# Patient Record
Sex: MALE | Race: WHITE
[De-identification: names, ages, dates, MRNs, and addresses within clinical notes are randomized per-mention and may not be internally consistent; named-entity substitution may affect disease eponyms.]

---

## 2018-05-20 NOTE — EDM.PDOC
ED HPI GENERAL MEDICAL PROBLEM





- General


Chief Complaint: Upper Extremity Injury/Pain


Stated Complaint: dropped [employer] of vehicle on hand [phone]


Time Seen by Provider: 05/20/18 20:49


Source of Information: Reports: Patient


History Limitations: Reports: No Limitations





- History of Present Illness


INITIAL COMMENTS - FREE TEXT/NARRATIVE: 





[place] of jeep fell onto left wrist yesterday today thumb all swollen and painful


  ** Right Hand


Pain Score (Numeric/FACES): 6





- Related Data


 Allergies











Allergy/AdvReac Type Severity Reaction Status Date / Time


 


cyclobenzaprine HCl Allergy  Tachycardia Verified 05/20/18 20:19





[From Flexeril]     


 


meperidine HCl [From Demerol] Allergy  Vomiting Verified 05/20/18 20:19


 


morphine Allergy  Vomiting Verified 05/20/18 20:19


 


Sulfa (Sulfonamide Allergy  Itching Verified 05/20/18 20:19





Antibiotics)     


 


bee stings Allergy  Tachycardia Uncoded 05/20/18 20:19











Home Meds: 


 Home Meds





Digoxin [Digox] 0.5 tab PO QPM 10/04/14 [History]


Digoxin [Digox] 1 tab PO QAM 10/04/14 [History]


FLUoxetine HCl [Fluoxetine HCl] 1 tab PO DAILY 10/04/14 [History]


Lisinopril [Prinivil] 2 tab PO DAILY 10/04/14 [History]


Metoprolol Tartrate [Lopressor] 1 tab PO BID 10/04/14 [History]


Gabapentin [Neurontin] 600 mg PO TID 04/03/16 [History]


Meloxicam 15 mg PO DAILY 04/03/16 [History]


Methocarbamol 500 mg PO ASDIRECTED PRN 04/03/16 [History]


Multivitamin with Minerals [Multiple Vitamin] 1 tab PO DAILY 04/03/16 [History]


Omeprazole 20 mg PO DAILY 04/03/16 [History]


traZODone HCl [Trazodone HCl] 50 mg PO DAILY 05/20/18 [History]











Past Medical History


HEENT History: Reports: Impaired Vision


Other HEENT History: wears glasses


Cardiovascular History: Reports: CAD, Heart Valve Replacement, High Cholesterol

, Hypertension


Respiratory History: Reports: None


Gastrointestinal History: Reports: GERD


Genitourinary History: Reports: None


Musculoskeletal History: Reports: Back Pain, Chronic, Osteoarthritis


Neurological History: Reports: None


Psychiatric History: Reports: Depression


Endocrine/Metabolic History: Reports: None


Hematologic History: Reports: None


Immunologic History: Reports: None


Oncologic (Cancer) History: Reports: None


Dermatologic History: Reports: None





- Infectious Disease History


Infectious Disease History: Reports: Chicken Pox





- Past Surgical History


Head Surgeries/Procedures: Reports: None


GI Surgical History: Reports: Hernia, Inguinal





Social & Family History





- Family History


Family Medical History: Noncontributory





- Tobacco Use


Smoking Status *Q: Never Smoker


Second Hand Smoke Exposure: No





- Caffeine Use


Caffeine Use: Reports: Coffee, Soda, Tea





- Recreational Drug Use


Recreational Drug Use: No





- Living Situation & Occupation


Living situation: Reports: , with Family


Occupation: Employed





Review of Systems





- Review of Systems


Review Of Systems: ROS reveals no pertinent complaints other than HPI.





ED EXAM, GENERAL





- Physical Exam


Exam: See Below


Exam Limited By: No Limitations


General Appearance: Alert, WD/WN, Mild Distress, Other (pain)


Ears: Hearing Grossly Normal


Throat/Mouth: Normal Voice, No Airway Compromise


Head: Atraumatic


Neck: Non-Tender, Full Range of Motion


Respiratory/Chest: No Respiratory Distress


Cardiovascular: Regular Rate, Rhythm


GI/Abdominal: Soft, Non-Tender


Extremities: Other (left thumb swollen tender R/P, NV wnl. no gross D/D.)


Neurological: Alert, Oriented, Normal Cognition, Normal Gait, No Motor/Sensory 

Deficits


Psychiatric: Flat Affect


Skin Exam: Warm, Dry, Normal Color


Lymphatic: No Adenopathy





Course





- Vital Signs


Last Recorded V/S: 


 Last Vital Signs











Temp  36.6 C   05/20/18 20:15


 


Pulse  58 L  05/20/18 20:15


 


Resp  18   05/20/18 20:15


 


BP  117/56 L  05/20/18 20:15


 


Pulse Ox  97   05/20/18 20:15














- Re-Assessments/Exams


Free Text/Narrative Re-Assessment/Exam: 





05/20/18 21:21


results discussed with pt.





Departure





- Departure


Time of Disposition: 21:22


Disposition: Home, Self-Care 01


Condition: Good


Clinical Impression: 


Thumb contusion


Qualifiers:


 Encounter type: initial encounter Damage to nail status: without damage 

Laterality: left Qualified Code(s): S60.012A - Contusion of left thumb without 

damage to nail, initial encounter








- Discharge Information


Instructions:  Contusion, Easy-to-Read


Referrals: 


Aileen Garcia, NP [Primary Care Provider] - 


Forms:  ED Department Discharge


Additional Instructions: 


1) wear brace for comfort 


2) ice intermittently to swelling 


3) see clinic for possible MRI SCAN if not totally better in few days


4) recheck if there is any change or concern

## 2018-07-09 NOTE — EDM.PDOC
Scribed by Maria Elena Reddy 07/09/18 [id number] for Valente Valerio MD





ED HPI GENERAL MEDICAL PROBLEM





- General


Chief Complaint: Back Pain or Injury


Stated Complaint: [id number] severe BACK PAIN


Time Seen by Provider: 07/09/18 17:16


Source of Information: Reports: Patient, RN, RN Notes Reviewed


History Limitations: Reports: No Limitations





- History of Present Illness


INITIAL COMMENTS - FREE TEXT/NARRATIVE: 


Patient presents to ER with complaint of flareup of chronic low back pain 

without any recent or new injury. Pain has been radiating down the right leg to 

the knee. Patient has history of 5 surgeries including hardware placement and 

fusion of the lower lumbar segments by Dr. Mcqueen in [place]. He has an 

appointment to see his doctor on Wednesday. Patient takes hydrocodone, 

Gabapentin and methacarbamol and Meloxican for his chronic pain. 


Onset: Gradual


Duration: Getting Worse


Location: Reports: Back (low)


Quality: Reports: Ache


Severity: Severe


Improves with: Reports: None


Worsens with: Reports: None


Associated Symptoms: Reports: No Other Symptoms





- Related Data


 Allergies











Allergy/AdvReac Type Severity Reaction Status Date / Time


 


cyclobenzaprine HCl Allergy  Tachycardia Verified 05/20/18 20:19





[From Flexeril]     


 


meperidine HCl [From Demerol] Allergy  Vomiting Verified 05/20/18 20:19


 


morphine Allergy  Vomiting Verified 05/20/18 20:19


 


Sulfa (Sulfonamide Allergy  Itching Verified 05/20/18 20:19





Antibiotics)     


 


bee stings Allergy  Tachycardia Uncoded 05/20/18 20:19











Home Meds: 


 Home Meds





Digoxin [Digox] 0.5 tab PO QPM 10/04/14 [History]


Digoxin [Digox] 1 tab PO QAM 10/04/14 [History]


Lisinopril [Prinivil] 2 tab PO DAILY 10/04/14 [History]


Metoprolol Tartrate [Lopressor] 1 tab PO BID 10/04/14 [History]


Gabapentin [Neurontin] 600 mg PO TID 04/03/16 [History]


Meloxicam 15 mg PO DAILY 04/03/16 [History]


Methocarbamol 500 mg PO ASDIRECTED PRN 04/03/16 [History]


Multivitamin with Minerals [Multiple Vitamin] 1 tab PO DAILY 04/03/16 [History]


Omeprazole 20 mg PO DAILY 04/03/16 [History]


traZODone HCl [Trazodone HCl] 50 mg PO DAILY 05/20/18 [History]











Past Medical History


HEENT History: Reports: Impaired Vision


Other HEENT History: wears glasses


Cardiovascular History: Reports: CAD, Heart Valve Replacement, High Cholesterol

, Hypertension


Respiratory History: Reports: None


Gastrointestinal History: Reports: GERD


Genitourinary History: Reports: None


Musculoskeletal History: Reports: Back Pain, Chronic, Osteoarthritis


Neurological History: Reports: None


Psychiatric History: Reports: Depression


Endocrine/Metabolic History: Reports: None


Hematologic History: Reports: None


Immunologic History: Reports: None


Oncologic (Cancer) History: Reports: None


Dermatologic History: Reports: None





- Infectious Disease History


Infectious Disease History: Reports: Chicken Pox





- Past Surgical History


Head Surgeries/Procedures: Reports: None


GI Surgical History: Reports: Hernia, Inguinal





Social & Family History





- Family History


Family Medical History: Noncontributory





- Caffeine Use


Caffeine Use: Reports: Coffee, Soda, Tea





- Living Situation & Occupation


Living situation: Reports: , with Family


Occupation: Employed





ED ROS GENERAL





- Review of Systems


Review Of Systems: ROS reveals no pertinent complaints other than HPI.





ED EXAM,LOWER BACK PAIN/INJURY





- Physical Exam


Exam: See Below


Exam Limited By: No Limitations


General Appearance: Alert, WD/WN, No Apparent Distress


Head: Atraumatic, Normocephalic


Respiratory/Chest: No Respiratory Distress


Cardiovascular: Normal Peripheral Pulses


GI/Abdominal: Normal Bowel Sounds, Soft, Non-Tender, No Distention


Back Exam: Decreased Range of Motion (due to pain), Muscle Spasm (lumbar region)

, Paraspinal Tenderness (lumbar ), Other (A well healed midline low lumbar 

surgical scar of approximately 7cm. ).  No: CVA Tenderness (L), CVA Tenderness (

R), Vertebral Tenderness


Extremities: Normal Inspection, Normal Range of Motion, Non-Tender, No Pedal 

Edema, Normal Capillary Refill


Neurological: Alert, Normal Mood/Affect, Normal Dorsiflexion, CN II-XII Intact, 

Normal Plantar Flexion, No Motor/Sensory Deficits, Oriented x 3, Other (

antalgic gait, but steady.)


Psychiatric: Normal Affect, Normal Mood


Skin Exam: Warm, Dry, Intact, Normal Color, No Rash





Course





- Orders/Labs/Meds


Meds: 


Medications














Discontinued Medications














Generic Name Dose Route Start Last Admin





  Trade Name Freq  PRN Reason Stop Dose Admin


 


Dexamethasone  8 mg  07/09/18 17:36  07/09/18 17:44





  Dexamethasone  IM  07/09/18 17:37  8 mg





  ONETIME ONE   Administration





     





     





     





     














Departure





- Departure


Time of Disposition: 17:44


Disposition: Home, Self-Care 01


Condition: Fair


Clinical Impression: 


 Acute exacerbation of chronic low back pain, Lumbar radiculopathy








- Discharge Information


Instructions:  Chronic Back Pain, Lumbosacral Radiculopathy


Referrals: 


Aileen Garcia NP [Primary Care Provider] - 


Forms:  ED Department Discharge


Additional Instructions: 


RX:  Decadron 4mg.


RX:  Lidocaine ointment5%.


Follow up with your back specialist as scheduled.





I have read and agree with the documentation that has been completed regarding 

this visit. By signing this record, I attest that the documentation was 

completed in my physical presence and is an accurate record of the encounter.

## 2019-11-02 ENCOUNTER — HOSPITAL ENCOUNTER (EMERGENCY)
Dept: HOSPITAL 43 - DL.ED | Age: 49
Discharge: HOME | End: 2019-11-02
Payer: COMMERCIAL

## 2019-11-02 VITALS — SYSTOLIC BLOOD PRESSURE: 146 MMHG | HEART RATE: 65 BPM | DIASTOLIC BLOOD PRESSURE: 71 MMHG

## 2019-11-02 DIAGNOSIS — Z88.2: ICD-10-CM

## 2019-11-02 DIAGNOSIS — Z91.030: ICD-10-CM

## 2019-11-02 DIAGNOSIS — M06.9: Primary | ICD-10-CM

## 2019-11-02 DIAGNOSIS — I10: ICD-10-CM

## 2019-11-02 DIAGNOSIS — I25.10: ICD-10-CM

## 2019-11-02 DIAGNOSIS — Z88.5: ICD-10-CM

## 2019-11-02 DIAGNOSIS — Z79.899: ICD-10-CM

## 2019-11-02 DIAGNOSIS — K21.9: ICD-10-CM

## 2019-11-02 PROCEDURE — 96372 THER/PROPH/DIAG INJ SC/IM: CPT

## 2019-11-02 PROCEDURE — 99283 EMERGENCY DEPT VISIT LOW MDM: CPT

## 2019-11-02 NOTE — EDM.PDOC
Scribed by Maria Elena Reddy 11/02/19 1228 for Valente Valeroi MD





ED HPI GENERAL MEDICAL PROBLEM





- General


Chief Complaint: General


Stated Complaint: PAIN


Time Seen by Provider: 11/02/19 12:10


Source of Information: Reports: Patient, RN, RN Notes Reviewed


History Limitations: Reports: No Limitations





- History of Present Illness


INITIAL COMMENTS - FREE TEXT/NARRATIVE: 


Patient presents to ER by POV with complaint of generalized pain from chronic 

health issues. He has history of RA and OA, back surgeries and neck surgeries. 

Denies any new or recent injury. 


Onset: Gradual


Duration: Constant


Location: Reports: Generalized


Quality: Reports: Ache


Severity: Severe


Improves with: Reports: None


Worsens with: Reports: None


Associated Symptoms: Reports: No Other Symptoms


  ** Generalized


Pain Score (Numeric/FACES): 4





- Related Data


 Allergies











Allergy/AdvReac Type Severity Reaction Status Date / Time


 


meperidine HCl [From Demerol] Allergy  Vomiting Verified 11/02/19 12:02


 


morphine Allergy  Vomiting Verified 11/02/19 12:02


 


Sulfa (Sulfonamide Allergy  Itching Verified 11/02/19 12:02





Antibiotics)     


 


bee stings Allergy  Tachycardia Uncoded 11/02/19 12:02











Home Meds: 


 Home Meds





Digoxin [Digox] 0.5 tab PO QPM 10/04/14 [History]


Lisinopril [Prinivil] 2 tab PO DAILY 10/04/14 [History]


Metoprolol Tartrate [Lopressor] 150 tab PO BID 10/04/14 [History]


Gabapentin [Neurontin] 1,200 mg PO TID 04/03/16 [History]


Meloxicam 15 mg PO DAILY 04/03/16 [History]


Methocarbamol 500 mg PO ASDIRECTED PRN 04/03/16 [History]


Multivitamin with Minerals [Multiple Vitamin] 1 tab PO DAILY 04/03/16 [History]


Omeprazole 20 mg PO DAILY 04/03/16 [History]


traZODone HCl [Trazodone HCl] 50 mg PO DAILY 05/20/18 [History]


Hydrocodone/Acetaminophen [Hydrocodon-Acetaminophen 5-325] 1 tab PO ASDIRECTED 

PRN 03/07/19 [History]


Digoxin [Digitek] 250 mcg PO DAILY 11/02/19 [History]











Past Medical History


HEENT History: Reports: Impaired Vision


Other HEENT History: wears glasses


Cardiovascular History: Reports: CAD, Heart Valve Replacement, High Cholesterol

, Hypertension, Other (See Below)


Respiratory History: Reports: None


Gastrointestinal History: Reports: GERD


Genitourinary History: Reports: None


Musculoskeletal History: Reports: Back Pain, Chronic, Neck Pain, Chronic, 

Osteoarthritis


Neurological History: Reports: None


Psychiatric History: Reports: Depression


Endocrine/Metabolic History: Reports: None


Hematologic History: Reports: None


Immunologic History: Reports: None


Oncologic (Cancer) History: Reports: None


Dermatologic History: Reports: None





- Infectious Disease History


Infectious Disease History: Reports: Chicken Pox





- Past Surgical History


Head Surgeries/Procedures: Reports: None


GI Surgical History: Reports: Hernia, Inguinal


Musculoskeletal Surgical History: Reports: Carpal Tunnel, Other (See Below)


Other Musculoskeletal Surgeries/Procedures:: back, neck surgery





Social & Family History





- Family History


Family Medical History: Noncontributory





- Tobacco Use


Smoking Status *Q: Never Smoker





- Caffeine Use


Caffeine Use: Reports: Energy Drinks





- Recreational Drug Use


Recreational Drug Use: No





- Living Situation & Occupation


Living situation: Reports: , with Family


Occupation: Employed





ED ROS GENERAL





- Review of Systems


Review Of Systems: ROS reveals no pertinent complaints other than HPI.





ED EXAM, GENERAL





- Physical Exam


Exam: See Below


Exam Limited By: No Limitations


General Appearance: Alert, WD/WN, No Apparent Distress


Eye Exam: Bilateral Eye: Normal Inspection


Throat/Mouth: Normal Inspection


Head: Atraumatic, Normocephalic


Neck: Normal Inspection, Non-Tender


Respiratory/Chest: No Respiratory Distress, Lungs Clear, Normal Breath Sounds, 

No Accessory Muscle Use, Chest Non-Tender


Cardiovascular: Regular Rate, Rhythm


Back Exam: Decreased Range of Motion (Chronic/stable per pt).  No: CVA 

Tenderness (L), CVA Tenderness (R), Paraspinal Tenderness, Vertebral Tenderness


Extremities: Normal Capillary Refill, Joint Swelling (Chronic/stable at B/L 

hands/fingers per pt.), Limited Range of Motion (Hands, fingers, ankles).  No: 

Increased Warmth, Redness


Neurological: Alert, Oriented, CN II-XII Intact, Normal Cognition, No Motor/

Sensory Deficits


Psychiatric: Normal Mood


Skin Exam: Warm, Dry, Intact, Normal Color, No Rash





Course





- Vital Signs


Last Recorded V/S: 


 Last Vital Signs











Temp  97.6 F   11/02/19 11:56


 


Pulse  65   11/02/19 11:56


 


Resp  14   11/02/19 11:56


 


BP  146/71 H  11/02/19 11:56


 


Pulse Ox  100   11/02/19 11:56














- Orders/Labs/Meds


Orders: 


 Active Orders 24 hr











 Category Date Time Status


 


 Lidocaine 5% Med  11/02/19 12:29 Once





 15 gm TOP ONETIME ONE   











Meds: 


Medications














Discontinued Medications














Generic Name Dose Route Start Last Admin





  Trade Name Freq  PRN Reason Stop Dose Admin


 


Ketorolac Tromethamine  60 mg  11/02/19 12:23  





  Toradol  IM  11/02/19 12:24  





  ONETIME ONE   





     





     





     





     














Departure





- Departure


Time of Disposition: 12:27


Disposition: Home, Self-Care 01


Condition: Good


Clinical Impression: 


 Rheumatoid arthritis flare








- Discharge Information


*PRESCRIPTION DRUG MONITORING PROGRAM REVIEWED*: No


*COPY OF PRESCRIPTION DRUG MONITORING REPORT IN PATIENT OLAYINKA: No


Instructions:  Arthritis


Forms:  ED Department Discharge


Additional Instructions: 


Rx: Prednisone 20mg


Use the Lidocaine Ointment to affected joints every 4 to 6 hours as needed for 

pain.


Follow up in clinic this week for recheck and referral to rheumatologist. 





- My Orders


Last 24 Hours: 


My Active Orders





11/02/19 12:29


Lidocaine 5%   15 gm TOP ONETIME ONE 














- Assessment/Plan


Last 24 Hours: 


My Active Orders





11/02/19 12:29


Lidocaine 5%   15 gm TOP ONETIME ONE 














I have read and agree with the documentation that has been completed regarding 

this visit. By signing this record, I attest that the documentation was 

completed in my physical presence and is an accurate record of the encounter.

## 2021-01-09 ENCOUNTER — HOSPITAL ENCOUNTER (EMERGENCY)
Dept: HOSPITAL 43 - DL.ED | Age: 51
Discharge: HOME | End: 2021-01-09
Payer: COMMERCIAL

## 2021-01-09 VITALS — DIASTOLIC BLOOD PRESSURE: 47 MMHG | SYSTOLIC BLOOD PRESSURE: 106 MMHG | HEART RATE: 64 BPM

## 2021-01-09 DIAGNOSIS — I10: ICD-10-CM

## 2021-01-09 DIAGNOSIS — R79.89: ICD-10-CM

## 2021-01-09 DIAGNOSIS — Z91.030: ICD-10-CM

## 2021-01-09 DIAGNOSIS — R07.89: ICD-10-CM

## 2021-01-09 DIAGNOSIS — Z88.2: ICD-10-CM

## 2021-01-09 DIAGNOSIS — Z88.5: ICD-10-CM

## 2021-01-09 DIAGNOSIS — I25.10: ICD-10-CM

## 2021-01-09 DIAGNOSIS — R55: ICD-10-CM

## 2021-01-09 DIAGNOSIS — K21.9: ICD-10-CM

## 2021-01-09 DIAGNOSIS — Z79.899: ICD-10-CM

## 2021-01-09 DIAGNOSIS — G45.8: Primary | ICD-10-CM

## 2021-01-09 LAB
ANION GAP SERPL CALC-SCNC: 15.1 MEQ/L (ref 7–13)
APTT PPP: 28.1 SEC (ref 22–34)
CHLORIDE SERPL-SCNC: 105 MMOL/L (ref 98–107)
SODIUM SERPL-SCNC: 141 MMOL/L (ref 136–145)

## 2021-01-09 PROCEDURE — 84484 ASSAY OF TROPONIN QUANT: CPT

## 2021-01-09 PROCEDURE — 85379 FIBRIN DEGRADATION QUANT: CPT

## 2021-01-09 PROCEDURE — 85730 THROMBOPLASTIN TIME PARTIAL: CPT

## 2021-01-09 PROCEDURE — 71045 X-RAY EXAM CHEST 1 VIEW: CPT

## 2021-01-09 PROCEDURE — 83690 ASSAY OF LIPASE: CPT

## 2021-01-09 PROCEDURE — 36415 COLL VENOUS BLD VENIPUNCTURE: CPT

## 2021-01-09 PROCEDURE — 85025 COMPLETE CBC W/AUTO DIFF WBC: CPT

## 2021-01-09 PROCEDURE — 82550 ASSAY OF CK (CPK): CPT

## 2021-01-09 PROCEDURE — 85610 PROTHROMBIN TIME: CPT

## 2021-01-09 PROCEDURE — 84443 ASSAY THYROID STIM HORMONE: CPT

## 2021-01-09 PROCEDURE — 80053 COMPREHEN METABOLIC PANEL: CPT

## 2021-01-09 PROCEDURE — 80162 ASSAY OF DIGOXIN TOTAL: CPT

## 2021-01-09 PROCEDURE — 83880 ASSAY OF NATRIURETIC PEPTIDE: CPT

## 2021-01-09 PROCEDURE — 82150 ASSAY OF AMYLASE: CPT

## 2021-01-09 PROCEDURE — 99284 EMERGENCY DEPT VISIT MOD MDM: CPT

## 2021-01-09 PROCEDURE — 83735 ASSAY OF MAGNESIUM: CPT

## 2021-01-09 PROCEDURE — 93010 ELECTROCARDIOGRAM REPORT: CPT

## 2021-01-09 PROCEDURE — 93005 ELECTROCARDIOGRAM TRACING: CPT

## 2021-01-09 NOTE — CR
PROCEDURE INFORMATION: 

Exam: XR Chest, 1 View 

Exam date and time: 1/9/2021 10:44 AM 

Age: 50 years old 

Clinical indication: Chest pain 



TECHNIQUE: 

Imaging protocol: XR of the chest 

Views: 1 view. 



COMPARISON: 

CR Chest 1V Frontal 8/26/2018 4:39 PM 



FINDINGS: 

Lungs: Unremarkable. No consolidation. 

Pleural space: Unremarkable. No pleural effusion. No pneumothorax. 

Heart/Mediastinum: The heart size is mildly enlarged with postoperative change 

from prior cardiac surgery. 

Bones/joints: Unremarkable. 



IMPRESSION: 

1. Cardiomegaly and postoperative change. No acute cardiopulmonary disease 

present.

## 2021-01-09 NOTE — EDM.PDOC
Scribed by Maria Elena Reddy 21 [id number] for Cuauhtemoc Valerio MD





ED HPI GENERAL MEDICAL PROBLEM





- General


Chief Complaint: Chest Pain


Stated Complaint: CHEST PAIN


Time Seen by Provider: 21 10:22


Source of Information: Reports: Patient, RN, RN Notes Reviewed


History Limitations: Reports: No Limitations





- History of Present Illness


INITIAL COMMENTS - FREE TEXT/NARRATIVE: 


Patient arrives to ED by POV with chest pain for 2 days. He has had recurrent 

postural near syncope for 2 weeks. Chest pain is intermittent centered in left 

chest radiating into left arm accompanied with nausea. No reported vomiting. 

Also complaining of dry cough. Rates the pain 3/10. Reports history of cold 

during  where he felt congested with nausea and chills. Negative COVID 

test around . History of a tricuspid valve reconstruction in . 

History of neck fusion and lumbar rods as well as spinal stimulator Denies 

fever, productive cough, chills, headache, blurry vision, difficulty swallowing,

shortness of breath, abdominal pain and edema in the lower extremities. 


Onset: Gradual


Duration: Waxing/Waning


Location: Reports: Chest, Upper Extremity, Left


Quality: Reports: Sharp


Severity: Mild


Improves with: Reports: None


Worsens with: Reports: None


Associated Symptoms: Reports: Nausea/Vomiting, Syncope (near)


  ** Left Chest


Pain Score (Numeric/FACES): 3





- Related Data


                                    Allergies











Allergy/AdvReac Type Severity Reaction Status Date / Time


 


meperidine HCl [From Demerol] Allergy  Vomiting Verified 21 10:24


 


morphine Allergy  Vomiting Verified 21 10:24


 


Sulfa (Sulfonamide Allergy  Itching Verified 21 10:24





Antibiotics)     


 


bee stings Allergy  Tachycardia Uncoded 21 10:24











Home Meds: 


                                    Home Meds





Metoprolol Tartrate [Lopressor] 150 tab PO BID 10/04/14 [History]


lisinopriL [Prinivil] 2 tab PO DAILY 10/04/14 [History]


Gabapentin [Neurontin] 1,200 mg PO TID 16 [History]


Meloxicam 15 mg PO DAILY 16 [History]


Multivitamin with Minerals [Multiple Vitamin] 1 tab PO DAILY 16 [History]


Omeprazole 20 mg PO DAILY 16 [History]


traZODone HCl [Trazodone HCl] 50 mg PO DAILY 18 [History]


Digoxin [Digitek] 250 mcg PO DAILY 19 [History]











Past Medical History


HEENT History: Reports: Impaired Vision


Other HEENT History: wears glasses


Cardiovascular History: Reports: CAD, Heart Valve Replacement, High Cholesterol,

 Hypertension, Other (See Below)


Respiratory History: Reports: None


Gastrointestinal History: Reports: GERD


Genitourinary History: Reports: None


Musculoskeletal History: Reports: Back Pain, Chronic, Neck Pain, Chronic, 

Osteoarthritis


Neurological History: Reports: None


Psychiatric History: Reports: Depression


Endocrine/Metabolic History: Reports: None


Hematologic History: Reports: None


Immunologic History: Reports: None


Oncologic (Cancer) History: Reports: None


Dermatologic History: Reports: None





- Infectious Disease History


Infectious Disease History: Reports: Chicken Pox





- Past Surgical History


Head Surgeries/Procedures: Reports: None


GI Surgical History: Reports: Hernia, Inguinal


Musculoskeletal Surgical History: Reports: Carpal Tunnel, Other (See Below)


Other Musculoskeletal Surgeries/Procedures:: back, neck surgery





Social & Family History





- Family History


Family Medical History: No Pertinent Family History





- Caffeine Use


Caffeine Use: Reports: Energy Drinks





- Living Situation & Occupation


Living situation: Reports: , with Family


Occupation: Employed





ED ROS GENERAL





- Review of Systems


Review Of Systems: Comprehensive ROS is negative, except as noted in HPI.





ED EXAM, GENERAL





- Physical Exam


Exam: See Below


Exam Limited By: No Limitations


General Appearance: Alert, WD/WN, No Apparent Distress


Nose: Normal Inspection


Throat/Mouth: Normal Inspection, Normal Lips, Normal Teeth, Normal Gums, Normal 

Oropharynx, Normal Voice, No Airway Compromise


Head: Atraumatic, Normocephalic


Neck: Normal Inspection, Supple, Non-Tender, Full Range of Motion


Respiratory/Chest: No Respiratory Distress, Lungs Clear, Normal Breath Sounds, 

No Accessory Muscle Use, Chest Non-Tender


Cardiovascular: Normal Peripheral Pulses, Regular Rate, Rhythm, No Edema, No 

Gallop, No JVD, No Murmur, No Rub


Peripheral Pulses: 2+: Radial (R), 4+: Carotid (L), Carotid (R), Brachial (L), 

Brachial (R), Radial (L), Femoral (L), Femoral (R), Posterior Tibial (L), 

Posterior Tibial (R), Dorsalis Pedis (L), Dorsalis Pedis (R)


GI/Abdominal: Soft, Non-Tender


 (Male) Exam: Deferred


Rectal (Males) Exam: Deferred


Neurological: Alert, Oriented, Normal Cognition


Psychiatric: Normal Affect


  ** #1 Interpretation


EKG Date: 21


Time: 10:25


Rhythm: Other (sinus rhythm)


Rate (Beats/Min): 61


Axis: Normal


P-Wave: Present


QRS: Normal


ST-T: Other (borderline T abnormalities, diffuse leads)


QT: Normal


Comparison: No Change





Course





- Vital Signs


Last Recorded V/S: 


                                Last Vital Signs











Temp  97.3 F   21 10:20


 


Pulse  64   21 10:20


 


Resp  18   21 10:20


 


BP  106/47 L  21 10:20


 


Pulse Ox  95   21 10:20








                                        





Orthostatic Blood Pressure [     104/56


Standing]                        


Orthostatic Blood Pressure [     105/59


Sitting]                         


Orthostatic Blood Pressure [     103/51


Supine]                          











- Orders/Labs/Meds


Orders: 


                               Active Orders 24 hr











 Category Date Time Status


 


 EKG 12 Lead [EKG Documentation Completion] [RC] STAT Care  21 10:23 

Active


 


 Orthostatic Vital Signs [RC] ASDIRECTED Care  21 10:33 Active


 


 Peripheral IV Care [RC] .AS DIRECTED Care  21 10:24 Active


 


 Sodium Chloride 0.9% [Saline Flush] Med  21 10:24 Active





 10 ml FLUSH ASDIRECTED PRN   


 


 Peripheral IV Insertion Adult [OM.PC] Routine Oth  21 10:23 Ordered








                                Medication Orders





Sodium Chloride (Saline Flush)  10 ml FLUSH ASDIRECTED PRN


   PRN Reason: Keep Vein Open


   Last Admin: 21 10:41  Dose: 10 ml


   Documented by: SUNSHINE








Labs: 


                                Laboratory Tests











  21 Range/Units





  10:36 10:36 10:36 


 


WBC  8.0    (5.0-10.0)  10^3/uL


 


RBC  4.44 L    (4.6-6.2)  10^6/uL


 


Hgb  13.8 L    (14.0-18.0)  g/dL


 


Hct  39.9 L    (40.0-54.0)  %


 


MCV  89.9    ()  fL


 


MCH  31.1    (27.0-34.0)  pg


 


MCHC  34.6    (33.0-35.0)  g/dL


 


Plt Count  171    (150-450)  10^3/uL


 


Neut % (Auto)  63.2    (42.2-75.2)  %


 


Lymph % (Auto)  24.5    (20.5-50.1)  %


 


Mono % (Auto)  10.3 H    (2-8)  %


 


Eos % (Auto)  1.6    (1.0-3.0)  %


 


Baso % (Auto)  0.4    (0.0-1.0)  %


 


PT    10.4  (9.0-12.0)  SEC


 


INR    1.1  (0.9-1.2)  


 


APTT    28.1  (22.0-34.0)  SEC


 


D-Dimer, Quantitative     (0-400)  ng/mL


 


Sodium   141   (136-145)  mmol/L


 


Potassium   4.1   (3.5-5.1)  mmol/L


 


Chloride   105   ()  mmol/L


 


Carbon Dioxide   25   (21-32)  mmol/L


 


Anion Gap   15.1 H   (7-13)  mEq/L


 


BUN   23 H   (7-18)  mg/dL


 


Creatinine   1.08   (0.70-1.30)  mg/dL


 


Est Cr Clr Drug Dosing   74.66   mL/min


 


Estimated GFR (MDRD)   > 60   


 


BUN/Creatinine Ratio   21.3   (No establ ref range)  


 


Glucose   95   (74-99)  mg/dL


 


Calcium   8.5   (8.5-10.1)  mg/dL


 


Magnesium   1.9   (1.8-2.4)  mg/dL


 


Total Bilirubin   0.3   (0.2-1.0)  mg/dL


 


AST   28   (15-37)  U/L


 


ALT   39   (16-63)  U/L


 


Alkaline Phosphatase   123 H   ()  U/L


 


Creatine Kinase   231   ()  U/L


 


Troponin I   < 0.017   (0.000-0.056)  ng/mL


 


B-Natriuretic Peptide   37   (0-100)  pg/ml


 


Total Protein   7.4   (6.4-8.2)  g/dL


 


Albumin   3.8   (3.4-5.0)  g/dL


 


Globulin   3.6   


 


Albumin/Globulin Ratio   1.1   


 


Amylase   51   ()  U/L


 


Lipase   193   ()  U/L


 


TSH, Ultra Sensitive   1.18   (0.36-3.74)  uIU/mL


 


Digoxin     (0.9-2.0)  ng/mL














  21 Range/Units





  10:36 10:36 


 


WBC    (5.0-10.0)  10^3/uL


 


RBC    (4.6-6.2)  10^6/uL


 


Hgb    (14.0-18.0)  g/dL


 


Hct    (40.0-54.0)  %


 


MCV    ()  fL


 


MCH    (27.0-34.0)  pg


 


MCHC    (33.0-35.0)  g/dL


 


Plt Count    (150-450)  10^3/uL


 


Neut % (Auto)    (42.2-75.2)  %


 


Lymph % (Auto)    (20.5-50.1)  %


 


Mono % (Auto)    (2-8)  %


 


Eos % (Auto)    (1.0-3.0)  %


 


Baso % (Auto)    (0.0-1.0)  %


 


PT    (9.0-12.0)  SEC


 


INR    (0.9-1.2)  


 


APTT    (22.0-34.0)  SEC


 


D-Dimer, Quantitative   < 100  (0-400)  ng/mL


 


Sodium    (136-145)  mmol/L


 


Potassium    (3.5-5.1)  mmol/L


 


Chloride    ()  mmol/L


 


Carbon Dioxide    (21-32)  mmol/L


 


Anion Gap    (7-13)  mEq/L


 


BUN    (7-18)  mg/dL


 


Creatinine    (0.70-1.30)  mg/dL


 


Est Cr Clr Drug Dosing    mL/min


 


Estimated GFR (MDRD)    


 


BUN/Creatinine Ratio    (No establ ref range)  


 


Glucose    (74-99)  mg/dL


 


Calcium    (8.5-10.1)  mg/dL


 


Magnesium    (1.8-2.4)  mg/dL


 


Total Bilirubin    (0.2-1.0)  mg/dL


 


AST    (15-37)  U/L


 


ALT    (16-63)  U/L


 


Alkaline Phosphatase    ()  U/L


 


Creatine Kinase    ()  U/L


 


Troponin I    (0.000-0.056)  ng/mL


 


B-Natriuretic Peptide    (0-100)  pg/ml


 


Total Protein    (6.4-8.2)  g/dL


 


Albumin    (3.4-5.0)  g/dL


 


Globulin    


 


Albumin/Globulin Ratio    


 


Amylase    ()  U/L


 


Lipase    ()  U/L


 


TSH, Ultra Sensitive    (0.36-3.74)  uIU/mL


 


Digoxin  2.3 H*   (0.9-2.0)  ng/mL











Meds: 


Medications











Generic Name Dose Route Start Last Admin





  Trade Name Freq  PRN Reason Stop Dose Admin


 


Sodium Chloride  10 ml  21 10:24  21 10:41





  Saline Flush  FLUSH   10 ml





  ASDIRECTED PRN   Administration





  Keep Vein Open  














Discontinued Medications














Generic Name Dose Route Start Last Admin





  Trade Name Freq  PRN Reason Stop Dose Admin


 


Aspirin  324 mg  21 10:25  21 10:40





  Aspirin  PO  21 10:26  324 mg





  ONETIME ONE   Administration














- Radiology Interpretation


Free Text/Narrative:: 


[facility] [place] [facility]


Final Radiology Report  Call: [phone]


assistance Online chat: [email or web address]


Name: SARAH BLACKBURN Age: 50Years M Date: 2021


MRN: [id number] SSN: -- : 1970


Study: CR CHEST 1V FRONTAL Requesting Physician: CUAUHTEMOC VALERIO


Accession: [id number] Images: 1


Addl Studies:


Provided Clinical History: chest pain


Contrast: Contrast Medium:


Contrast Amount: Contrast Method:


CONFIDENTIALITY STATEMENT


This report is intended only for use by the referring physician, and only in 

accordance with law. If you received this in error, call [phone].


Page 1 of 1


PROCEDURE INFORMATION:


Exam: XR Chest, 1 View


Exam date and time: 2021 10:44 AM


Age: 50 years old


Clinical indication: Chest pain


TECHNIQUE:


Imaging protocol: XR of the chest


Views: 1 view.


COMPARISON:


CR Chest 1V Frontal 2018 4:39 PM


FINDINGS:


Lungs: Unremarkable. No consolidation.


Pleural space: Unremarkable. No pleural effusion. No pneumothorax.


Heart/Mediastinum: The heart size is mildly enlarged with postoperative change 

from prior cardiac


surgery.


Bones/joints: Unremarkable.


IMPRESSION:


1. Cardiomegaly and postoperative change. No acute cardiopulmonary disease 

present.


Thank you for allowing us to participate in the care of your patient.


Dictated and Authenticated by: Joe Baires MD


2021 10:52 AM Central Time ([place] & [place])








- Re-Assessments/Exams


Free Text/Narrative Re-Assessment/Exam: 





21 11:51


Pt has essentially ruled himself out from an acute cardiac ischemia standpoint. 

He has negative Troponin, no ischemic changes on his EKG, and no other acute 

findings on exam. He does have a moderately noticeable stronger pulse at the l

eft radial, compared to the right radial wrist. Unclear if this is related to 

his recurrent near syncope or not. However, pt does relate that many years ago 

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

 and heavy feeling.  





Departure





- Departure


Time of Disposition: 11:57


Disposition: Home, Self-Care 01


Condition: Good


Clinical Impression: 


 Postural dizziness with near syncope, Subclavian steal syndrome of right 

subclavian artery, Atypical chest pain, Elevated digoxin level





Instructions:  Nonspecific Chest Pain, Adult, Near-Syncope


Forms:  ED Department Discharge


Additional Instructions: 


Do not take your Digoxin tomorrow or Monday.


Follow up Monday,  with your primary clinic for consideration of CT 

Angio Neck and chest for evaluation of the difference in blood pressure and 

pulse strength in on your right arm compared to the left. Also, to see if that 

explains a vascular cause for your postural near syncope episodes.





Sepsis Event Note (ED)





- Focused Exam


Vital Signs: 


                                   Vital Signs











  Temp Pulse Resp BP Pulse Ox


 


 21 10:20  97.3 F  64  18  106/47 L  95














- My Orders


Last 24 Hours: 


My Active Orders





21 10:23


EKG 12 Lead [EKG Documentation Completion] [RC] STAT 


Peripheral IV Insertion Adult [OM.PC] Routine 





21 10:24


Peripheral IV Care [RC] .AS DIRECTED 


Sodium Chloride 0.9% [Saline Flush]   10 ml FLUSH ASDIRECTED PRN 





21 10:33


Orthostatic Vital Signs [RC] ASDIRECTED 














- Assessment/Plan


Last 24 Hours: 


My Active Orders





21 10:23


EKG 12 Lead [EKG Documentation Completion] [RC] STAT 


Peripheral IV Insertion Adult [OM.PC] Routine 





21 10:24


Peripheral IV Care [RC] .AS DIRECTED 


Sodium Chloride 0.9% [Saline Flush]   10 ml FLUSH ASDIRECTED PRN 





21 10:33


Orthostatic Vital Signs [RC] ASDIRECTED 














I have read and agree with the documentation that has been completed regarding 

this visit. By signing this record, I attest that the documentation was 

completed in my physical presence and is an accurate record of the encounter.

## 2021-01-19 ENCOUNTER — HOSPITAL ENCOUNTER (EMERGENCY)
Dept: HOSPITAL 43 - DL.ED | Age: 51
Discharge: HOME | End: 2021-01-19
Payer: COMMERCIAL

## 2021-01-19 VITALS — HEART RATE: 56 BPM | SYSTOLIC BLOOD PRESSURE: 124 MMHG | DIASTOLIC BLOOD PRESSURE: 66 MMHG

## 2021-01-19 DIAGNOSIS — I10: ICD-10-CM

## 2021-01-19 DIAGNOSIS — Z88.5: ICD-10-CM

## 2021-01-19 DIAGNOSIS — R07.89: Primary | ICD-10-CM

## 2021-01-19 DIAGNOSIS — I25.10: ICD-10-CM

## 2021-01-19 DIAGNOSIS — K21.9: ICD-10-CM

## 2021-01-19 DIAGNOSIS — Z91.030: ICD-10-CM

## 2021-01-19 DIAGNOSIS — Z88.2: ICD-10-CM

## 2021-01-19 LAB
ANION GAP SERPL CALC-SCNC: 12.4 MEQ/L (ref 7–13)
CHLORIDE SERPL-SCNC: 103 MMOL/L (ref 98–107)
SODIUM SERPL-SCNC: 138 MMOL/L (ref 136–145)

## 2021-01-19 NOTE — CR
PROCEDURE INFORMATION: 

Exam: XR Chest, 1 View 

Exam date and time: 1/19/2021 11:05 AM 

Age: 50 years old 

Clinical indication: Other: Chest pain 



TECHNIQUE: 

Imaging protocol: XR of the chest 

Views: 1 view. 



COMPARISON: 

CR Chest 1V Frontal 1/9/2021 10:44 AM 



FINDINGS: 

Lungs: Unremarkable. No consolidation. 

Pleural space: Unremarkable. No pleural effusion. No pneumothorax. 

Heart/Mediastinum: Borderline heart size 

Bones/joints: Status post lower cervical fusion. There is evidence of a 

previous sternotomy. 



IMPRESSION: 

No acute findings.

## 2021-01-19 NOTE — EDM.PDOC
ED HPI GENERAL MEDICAL PROBLEM





- General


Chief Complaint: Cardiovascular Problem


Stated Complaint: CHEST DISCOMFORT


Time Seen by Provider: 01/19/21 11:15


Source of Information: Reports: Patient, Old Records, RN, RN Notes Reviewed


History Limitations: Reports: No Limitations





- History of Present Illness


INITIAL COMMENTS - FREE TEXT/NARRATIVE: 


Patient presents to the ED via personal vehicle with complaints of chest 

discomfort and shortness of breath.  The patient reports a history of tricuspid 

valve repair in 2004 and is subsequently on Digoxin, Metoprolol, and Lisinopril.

 He states he was seen in this facility last week for similar symptoms and has 

since been in contact with Cardiology; he has an appointment to be seen by 

Cardiology tomorrow.  The patient states his chest discomfort has been going on 

for several weeks and is not improving or worsening.  He denies recent illness, 

fever, shaking chills, cough, sore throat, palpitations, dyspepsia, nausea, 

vomiting, or diarrhea.  He denies tobacco, alcohol, or recreational drug use. 








- Related Data


                                    Allergies











Allergy/AdvReac Type Severity Reaction Status Date / Time


 


meperidine HCl [From Demerol] Allergy  Vomiting Verified 01/09/21 10:24


 


morphine Allergy  Vomiting Verified 01/09/21 10:24


 


Sulfa (Sulfonamide Allergy  Itching Verified 01/09/21 10:24





Antibiotics)     


 


bee stings Allergy  Tachycardia Uncoded 01/09/21 10:24











Home Meds: 


                                    Home Meds





Metoprolol Tartrate [Lopressor] 150 tab PO BID 10/04/14 [History]


lisinopriL [Prinivil] 2 tab PO DAILY 10/04/14 [History]


Gabapentin [Neurontin] 1,200 mg PO TID 04/03/16 [History]


Meloxicam 15 mg PO DAILY 04/03/16 [History]


Multivitamin with Minerals [Multiple Vitamin] 1 tab PO DAILY 04/03/16 [History]


Omeprazole 20 mg PO DAILY 04/03/16 [History]


traZODone HCl [Trazodone HCl] 50 mg PO DAILY 05/20/18 [History]


Digoxin [Digitek] 250 mcg PO DAILY 11/02/19 [History]











Past Medical History


HEENT History: Reports: Impaired Vision


Other HEENT History: wears glasses


Cardiovascular History: Reports: CAD, Heart Valve Replacement, High Cholesterol,

 Hypertension, Other (See Below)


Respiratory History: Reports: None


Gastrointestinal History: Reports: GERD


Genitourinary History: Reports: None


Musculoskeletal History: Reports: Back Pain, Chronic, Neck Pain, Chronic, 

Osteoarthritis


Neurological History: Reports: None


Psychiatric History: Reports: Depression


Endocrine/Metabolic History: Reports: None


Hematologic History: Reports: None


Immunologic History: Reports: None


Oncologic (Cancer) History: Reports: None


Dermatologic History: Reports: None





- Infectious Disease History


Infectious Disease History: Reports: Chicken Pox, Influenza





- Past Surgical History


Head Surgeries/Procedures: Reports: None


Cardiovascular Surgical History: Reports: Valve Replacement


GI Surgical History: Reports: Hernia, Inguinal


Musculoskeletal Surgical History: Reports: Carpal Tunnel, Other (See Below)


Other Musculoskeletal Surgeries/Procedures:: back, neck surgery, spinal cord 

stimulator





Social & Family History





- Family History


Family Medical History: No Pertinent Family History





- Tobacco Use


Tobacco Use Status *Q: Never Tobacco User





- Caffeine Use


Caffeine Use: Reports: None





- Recreational Drug Use


Recreational Drug Use: No





- Living Situation & Occupation


Living situation: Reports: , with Family


Occupation: Employed





ED ROS GENERAL





- Review of Systems


Review Of Systems: Comprehensive ROS is negative, except as noted in HPI.





ED EXAM, GENERAL





- Physical Exam


Exam: See Below


Exam Limited By: No Limitations


General Appearance: Alert, No Apparent Distress


Eye Exam: Bilateral Eye: EOMI, Normal Inspection, PERRL (3mm)


Throat/Mouth: Normal Inspection, Normal Voice, No Airway Compromise


Head: Atraumatic, Normocephalic


Neck: Normal Inspection, Supple, Non-Tender, Full Range of Motion.  No: 

Lymphadenopathy (L), Lymphadenopathy (R)


Respiratory/Chest: No Respiratory Distress, Lungs Clear, Normal Breath Sounds, 

No Accessory Muscle Use, Chest Non-Tender


Cardiovascular: Normal Peripheral Pulses, Regular Rate, Rhythm, No Edema, No 

Gallop, No JVD, No Murmur, No Rub


Peripheral Pulses: 1+: Radial (R), 2+: Radial (L)


GI/Abdominal: Normal Bowel Sounds, Soft, Non-Tender, No Distention, No Mass, 

Pelvis Stable


 (Male) Exam: Deferred


Rectal (Males) Exam: Deferred


Back Exam: Normal Inspection, Full Range of Motion


Extremities: Normal Inspection, Normal Range of Motion, Non-Tender, No Pedal 

Edema, Normal Capillary Refill


Neurological: Alert, Oriented, CN II-XII Intact, Normal Cognition, Normal Gait, 

No Motor/Sensory Deficits


Psychiatric: Normal Affect, Normal Mood


Skin Exam: Warm, Dry, Intact, Normal Color, No Rash.  No: Ecchymosis, Erythema, 

Increased Warmth, Jaundice, Mottled, Pallor, Petechiae, Rash


  ** #1 Interpretation


EKG Date: 01/19/21


Time: 10:59


Rhythm: Other (Sinus Bradycardia)


Axis: Normal


P-Wave: Present


QRS: Normal


ST-T: Normal


QT: Normal


Comparison: No Change


EKG Interpretation Comments: 


SB: No evidence of acute ischemia








Course





- Vital Signs


Last Recorded V/S: 


                                Last Vital Signs











Temp  98.2 F   01/19/21 11:02


 


Pulse  56 L  01/19/21 11:02


 


Resp  16   01/19/21 11:02


 


BP  124/66   01/19/21 11:02


 


Pulse Ox  99   01/19/21 11:02














- Orders/Labs/Meds


Orders: 


                               Active Orders 24 hr











 Category Date Time Status


 


 EKG Documentation Completion [RC] STAT Care  01/19/21 10:47 Active











Labs: 


                                Laboratory Tests











  01/19/21 01/19/21 01/19/21 Range/Units





  10:59 10:59 10:59 


 


WBC   8.3   (5.0-10.0)  10^3/uL


 


RBC   4.34 L   (4.6-6.2)  10^6/uL


 


Hgb   13.5 L   (14.0-18.0)  g/dL


 


Hct   38.9 L   (40.0-54.0)  %


 


MCV   89.6   ()  fL


 


MCH   31.1   (27.0-34.0)  pg


 


MCHC   34.7   (33.0-35.0)  g/dL


 


Plt Count   186   (150-450)  10^3/uL


 


Neut % (Auto)   64.2   (42.2-75.2)  %


 


Lymph % (Auto)   24.0   (20.5-50.1)  %


 


Mono % (Auto)   10.4 H   (2-8)  %


 


Eos % (Auto)   1.2   (1.0-3.0)  %


 


Baso % (Auto)   0.2   (0.0-1.0)  %


 


Sodium  138    (136-145)  mmol/L


 


Potassium  4.4    (3.5-5.1)  mmol/L


 


Chloride  103    ()  mmol/L


 


Carbon Dioxide  27    (21-32)  mmol/L


 


Anion Gap  12.4    (7-13)  mEq/L


 


BUN  25 H    (7-18)  mg/dL


 


Creatinine  1.01    (0.70-1.30)  mg/dL


 


Est Cr Clr Drug Dosing  85.59    mL/min


 


Estimated GFR (MDRD)  > 60    


 


BUN/Creatinine Ratio  24.8    (No establ ref range)  


 


Glucose  88    (74-99)  mg/dL


 


Calcium  8.8    (8.5-10.1)  mg/dL


 


Total Bilirubin  0.4    (0.2-1.0)  mg/dL


 


AST  20    (15-37)  U/L


 


ALT  38    (16-63)  U/L


 


Alkaline Phosphatase  125 H    ()  U/L


 


Troponin I  < 0.017    (0.000-0.056)  ng/mL


 


B-Natriuretic Peptide    44  (0-100)  pg/ml


 


Total Protein  7.6    (6.4-8.2)  g/dL


 


Albumin  3.8    (3.4-5.0)  g/dL


 


Globulin  3.8    


 


Albumin/Globulin Ratio  1.0    


 


Digoxin     (0.9-2.0)  ng/mL














  01/19/21 Range/Units





  10:59 


 


WBC   (5.0-10.0)  10^3/uL


 


RBC   (4.6-6.2)  10^6/uL


 


Hgb   (14.0-18.0)  g/dL


 


Hct   (40.0-54.0)  %


 


MCV   ()  fL


 


MCH   (27.0-34.0)  pg


 


MCHC   (33.0-35.0)  g/dL


 


Plt Count   (150-450)  10^3/uL


 


Neut % (Auto)   (42.2-75.2)  %


 


Lymph % (Auto)   (20.5-50.1)  %


 


Mono % (Auto)   (2-8)  %


 


Eos % (Auto)   (1.0-3.0)  %


 


Baso % (Auto)   (0.0-1.0)  %


 


Sodium   (136-145)  mmol/L


 


Potassium   (3.5-5.1)  mmol/L


 


Chloride   ()  mmol/L


 


Carbon Dioxide   (21-32)  mmol/L


 


Anion Gap   (7-13)  mEq/L


 


BUN   (7-18)  mg/dL


 


Creatinine   (0.70-1.30)  mg/dL


 


Est Cr Clr Drug Dosing   mL/min


 


Estimated GFR (MDRD)   


 


BUN/Creatinine Ratio   (No establ ref range)  


 


Glucose   (74-99)  mg/dL


 


Calcium   (8.5-10.1)  mg/dL


 


Total Bilirubin   (0.2-1.0)  mg/dL


 


AST   (15-37)  U/L


 


ALT   (16-63)  U/L


 


Alkaline Phosphatase   ()  U/L


 


Troponin I   (0.000-0.056)  ng/mL


 


B-Natriuretic Peptide   (0-100)  pg/ml


 


Total Protein   (6.4-8.2)  g/dL


 


Albumin   (3.4-5.0)  g/dL


 


Globulin   


 


Albumin/Globulin Ratio   


 


Digoxin  1.6  (0.9-2.0)  ng/mL














- Radiology Interpretation


Free Text/Narrative:: 


Cardiac workup unremarkable for acute processes.  CXR unremarkable for acute 

processes.  Lab results, imaging, and EKG discussed with patient.  Patient 

instructed to continue with Cardiology appointment, as previously scheduled.  

Patient verbalized understanding and agreement with the plan of care. 








Departure





- Departure


Time of Disposition: 11:59


Disposition: Home, Self-Care 01


Condition: Good


Clinical Impression: 


 Atypical chest pain





Instructions:  Nonspecific Chest Pain, Adult, Easy-to-Read


Forms:  ED Department Discharge


Additional Instructions: 


1.) Continue with your previously scheduled Cardiology appointment tomorrow for 

ongoing investigation into this problem. 


2.) Continue with your medications, as previously prescribed.


3.) Drink plenty of water to stay hydrated. 





Sepsis Event Note (ED)





- Evaluation


Sepsis Screening Result: No Definite Risk





- Focused Exam


Vital Signs: 


                                   Vital Signs











  Temp Pulse Resp BP Pulse Ox


 


 01/19/21 11:02  98.2 F  56 L  16  124/66  99

## 2021-05-19 NOTE — US
EXAMINATION: Retroperitoneal Comp  SEX: Male   AGE: 50 years

 

CLINICAL HISTORY: 50-year-old "dehydrated" male with acute kidney injury.

 

Interpretation: Negative exam.

1. Normal reniform size, axis and configuration bilaterally.

Right kidney measures 11.7 cm L. (Right kidney measures 6.07 cm W). 

Left kidney measures 12.68 cm L. (left kidney measures 6.53 cm W).

2. No sign of cystic or solid renal cortical mass lesion. No cortical "mantle"

inflammation or scar.

3. No sonographic evidence of urolithiasis or obstructive uropathy.

4. Symmetrically distended normal appearing urinary bladder. No mucosal wall

mass or dependent stones.

5. No extra vesicular pelvic mass lesion.
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
## 2021-05-19 NOTE — EDM.PDOC
ED HPI GENERAL MEDICAL PROBLEM





- General


Chief Complaint: General


Stated Complaint: DEHYDRATION ? SEEING WHITE FLASHES BODY ACHE TIRED


Time Seen by Provider: 05/19/21 11:20


Source of Information: Reports: Patient


History Limitations: Reports: No Limitations





- History of Present Illness


INITIAL COMMENTS - FREE TEXT/NARRATIVE: 





This 49 yo male patient reports to the ED due to feeling fatigue. The patient 

reports he was working outside on Monday when he started to feel some heart 

palpitations and saw spots. The patient reports yesterday he had several similar

episodes. The patient reports today he has been feeling very fatigued. The 

patient reports he called his primary care facility and was advised to come to 

the ED. The patient reports he has not had any heart palpitations or chest pain 

today. The patient reports he did attempt to replace his fluids, but reports 

drinking about 10 ounces of Gatorade today. 


Onset Date: 05/17/21


Duration: Intermittent


Location: Reports: Generalized


Quality: Reports: Other


Severity: Mild


Improves with: Reports: None


Worsens with: Reports: None


Context: Reports: Activity


Associated Symptoms: Reports: No Other Symptoms, Nausea/Vomiting (nausea no 

vomiting)





- Related Data


                                    Allergies











Allergy/AdvReac Type Severity Reaction Status Date / Time


 


meperidine HCl [From Demerol] Allergy  Vomiting Verified 01/09/21 10:24


 


morphine Allergy  Vomiting Verified 01/09/21 10:24


 


Sulfa (Sulfonamide Allergy  Itching Verified 01/09/21 10:24





Antibiotics)     


 


bee stings Allergy  Tachycardia Uncoded 01/09/21 10:24











Home Meds: 


                                    Home Meds





lisinopriL [Prinivil] 40 mg PO DAILY 10/04/14 [History]


Gabapentin [Neurontin] 600 mg PO TID 04/03/16 [History]


Meloxicam 15 mg PO DAILY 04/03/16 [History]


Multivitamin with Minerals [Multiple Vitamin] 1 tab PO DAILY 04/03/16 [History]


Omeprazole 20 mg PO DAILY 04/03/16 [History]


traZODone HCl [Trazodone HCl] 50 mg PO BEDTIME 05/20/18 [History]


Digoxin [Digitek] 250 mcg PO DAILY 11/02/19 [History]


Ascorbic Acid [Vitamin C] 1,000 mg PO DAILY 01/25/21 [History]


Cholecalciferol (Vitamin D3) [Vitamin D3] 2,000 units PO DAILY 01/25/21 

[History]


Cyclobenzaprine [Flexeril] 10 mg PO BEDTIME PRN 01/25/21 [History]


Digoxin 125 mcg PO BEDTIME 01/25/21 [History]


Eletriptan [Relpax] 40 mg PO ASDIRECTED PRN 01/25/21 [History]


Escitalopram [Lexapro] 20 mg PO DAILY 01/25/21 [History]


Metoprolol Tartrate 100 mg PO BID 01/25/21 [History]


Topiramate [Topamax] 75 mg PO BID 01/25/21 [History]


Turmeric Root Extract [Turmeric] 500 mg PO DAILY 01/25/21 [History]


Vitamin B Complex [B Complex] 1 tab PO DAILY 01/25/21 [History]


busPIRone HCl [busPIRone] 30 mg PO BID 01/25/21 [History]


methocarbamoL [Methocarbamol] 500 - 1,000 mg PO Q6HR PRN 01/25/21 [History]











Past Medical History


HEENT History: Reports: Impaired Vision


Other HEENT History: wears glasses


Cardiovascular History: Reports: CAD, Heart Valve Replacement, High Cholesterol,

 Hypertension, Other (See Below)


Respiratory History: Reports: None


Gastrointestinal History: Reports: GERD


Genitourinary History: Reports: None


Musculoskeletal History: Reports: Back Pain, Chronic, Neck Pain, Chronic, 

Osteoarthritis


Neurological History: Reports: None


Psychiatric History: Reports: Depression


Endocrine/Metabolic History: Reports: None


Hematologic History: Reports: None


Immunologic History: Reports: None


Oncologic (Cancer) History: Reports: None


Dermatologic History: Reports: None





- Infectious Disease History


Infectious Disease History: Reports: Chicken Pox, Influenza





- Past Surgical History


Head Surgeries/Procedures: Reports: None


Cardiovascular Surgical History: Reports: Valve Replacement


GI Surgical History: Reports: Hernia, Inguinal


Musculoskeletal Surgical History: Reports: Carpal Tunnel, Other (See Below)


Other Musculoskeletal Surgeries/Procedures:: back, neck surgery, spinal cord 

stimulator





Social & Family History





- Family History


Family Medical History: No Pertinent Family History





- Caffeine Use


Caffeine Use: Reports: None





- Living Situation & Occupation


Living situation: Reports: , with Family


Occupation: Employed





ED ROS GENERAL





- Review of Systems


Review Of Systems: Comprehensive ROS is negative, except as noted in HPI.





ED EXAM, GENERAL





- Physical Exam


Exam: See Below


Exam Limited By: No Limitations


General Appearance: Alert, WD/WN, Moderate Distress


Eye Exam: Bilateral Eye: EOMI, Normal Inspection, PERRL


Ears: Normal External Exam, Normal Canal, Hearing Grossly Normal, Normal TMs


Nose: Normal Inspection, Normal Mucosa, No Blood


Throat/Mouth: Normal Inspection, Normal Lips, Normal Teeth, Normal Gums, Normal 

Oropharynx, Normal Voice, No Airway Compromise


Head: Atraumatic, Normocephalic


Neck: Normal Inspection, Supple, Non-Tender, Full Range of Motion


Respiratory/Chest: No Respiratory Distress, Lungs Clear, Normal Breath Sounds, 

No Accessory Muscle Use, Chest Non-Tender


Cardiovascular: Normal Peripheral Pulses, Regular Rate, Rhythm, No Edema, No 

Gallop, No JVD, No Murmur, No Rub


GI/Abdominal: Normal Bowel Sounds, Soft, Non-Tender, No Organomegaly, No 

Distention, No Abnormal Bruit, No Mass


 (Male) Exam: Deferred


Rectal (Males) Exam: Deferred


Back Exam: Normal Inspection, Full Range of Motion, NT


Extremities: Normal Inspection, Normal Range of Motion, Non-Tender, Normal 

Capillary Refill, No Pedal Edema


Neurological: Alert, Oriented, CN II-XII Intact, Normal Cognition, Normal Gait, 

Normal Reflexes, No Motor/Sensory Deficits


Psychiatric: Normal Affect, Normal Mood


Skin Exam: Warm, Dry, Intact, Normal Color, No Rash


Lymphatic: No Adenopathy





Course





- Vital Signs


Last Recorded V/S: 


                                Last Vital Signs











Temp  97.1 F   05/19/21 11:44


 


Pulse  76   05/19/21 11:44


 


Resp  14   05/19/21 11:44


 


BP  101/53 L  05/19/21 11:44


 


Pulse Ox  98   05/19/21 11:44














- Orders/Labs/Meds


Orders: 


                               Active Orders 24 hr











 Category Date Time Status


 


 Admission Diagnosis [ADT] Urgent ADT  05/19/21 12:42 Ordered


 


 Admission Status [Patient Status] [ADT] Routine ADT  05/19/21 12:42 Ordered


 


 EKG Documentation Completion [RC] STAT Care  05/19/21 11:28 Active


 


 COVID-19/FLU A+B [MOLEC] Urgent Lab  05/19/21 12:36 Ordered


 


 Sodium Chloride 0.9% [Normal Saline] 1,000 ml Med  05/19/21 12:34 Active





 IV .BOLUS   








                                Medication Orders





Sodium Chloride (Normal Saline)  1,000 mls @ 999 mls/hr IV .BOLUS ONE


   Stop: 05/19/21 13:34








Labs: 


                                Laboratory Tests











  05/19/21 05/19/21 Range/Units





  11:36 11:36 


 


WBC  9.2   (5.0-10.0)  10^3/uL


 


RBC  3.78 L   (4.6-6.2)  10^6/uL


 


Hgb  11.9 L D   (14.0-18.0)  g/dL


 


Hct  35.4 L   (40.0-54.0)  %


 


MCV  93.7  D   ()  fL


 


MCH  31.5   (27.0-34.0)  pg


 


MCHC  33.6   (33.0-35.0)  g/dL


 


Plt Count  180   (150-450)  10^3/uL


 


Neut % (Auto)  67.5   (42.2-75.2)  %


 


Lymph % (Auto)  20.1 L   (20.5-50.1)  %


 


Mono % (Auto)  10.5 H   (2-8)  %


 


Eos % (Auto)  1.7   (1.0-3.0)  %


 


Baso % (Auto)  0.2   (0.0-1.0)  %


 


Sodium   140  (136-145)  mmol/L


 


Potassium   3.8  (3.5-5.1)  mmol/L


 


Chloride   104  ()  mmol/L


 


Carbon Dioxide   19 L  (21-32)  mmol/L


 


Anion Gap   20.8 H  (7-13)  mEq/L


 


BUN   64 H D  (7-18)  mg/dL


 


Creatinine   5.17 H* D  (0.70-1.30)  mg/dL


 


Est Cr Clr Drug Dosing   16.72  mL/min


 


Estimated GFR (MDRD)   12  


 


BUN/Creatinine Ratio   12.4  (No establ ref range)  


 


Glucose   105 H  (70-99)  mg/dL


 


Calcium   7.7 L  (8.5-10.1)  mg/dL


 


Magnesium   2.5 H  (1.8-2.4)  mg/dL


 


Total Bilirubin   0.5  (0.2-1.0)  mg/dL


 


AST   51 H  (15-37)  U/L


 


ALT   54  (16-63)  U/L


 


Alkaline Phosphatase   139 H  ()  U/L


 


Troponin I   < 0.017  (0.000-0.056)  ng/mL


 


Total Protein   6.9  (6.4-8.2)  g/dL


 


Albumin   3.5  (3.4-5.0)  g/dL


 


Globulin   3.4  


 


Albumin/Globulin Ratio   1.0  











Meds: 


Medications











Generic Name Dose Route Start Last Admin





  Trade Name Davina  PRN Reason Stop Dose Admin


 


Sodium Chloride  1,000 mls @ 999 mls/hr  05/19/21 12:34 





  Normal Saline  IV  05/19/21 13:34 





  .BOLUS ONE  














Departure





- Departure


Time of Disposition: 12:44


Disposition: Admitted As Inpatient 67


Condition: Fair


Clinical Impression: 


Acute renal failure


Qualifiers:


 Acute renal failure type: unspecified Qualified Code(s): N17.9 - Acute kidney 

failure, unspecified








- Discharge Information


*PRESCRIPTION DRUG MONITORING PROGRAM REVIEWED*: Not Applicable


*COPY OF PRESCRIPTION DRUG MONITORING REPORT IN PATIENT OLAYINKA: Not Applicable


Care Plan Goals: 


Discussed the patient's history, examination, EKG and lab results with Dr. Dickson. Dr. Dickson accepted the patient for continued evaluation and 

treatment as an inpatient at [facility]. 





Sepsis Event Note (ED)





- Focused Exam


Vital Signs: 


                                   Vital Signs











  Temp Pulse Resp BP Pulse Ox


 


 05/19/21 11:44  97.1 F  76  14  101/53 L  98














- My Orders


Last 24 Hours: 


My Active Orders





05/19/21 11:28


EKG Documentation Completion [RC] STAT 





05/19/21 12:34


Sodium Chloride 0.9% [Normal Saline] 1,000 ml IV .BOLUS 





05/19/21 12:36


COVID-19/FLU A+B [MOLEC] Urgent 





05/19/21 12:42


Admission Diagnosis [ADT] Urgent 


Admission Status [Patient Status] [ADT] Routine 














- Assessment/Plan


Last 24 Hours: 


My Active Orders





05/19/21 11:28


EKG Documentation Completion [RC] STAT 





05/19/21 12:34


Sodium Chloride 0.9% [Normal Saline] 1,000 ml IV .BOLUS 





05/19/21 12:36


COVID-19/FLU A+B [MOLEC] Urgent 





05/19/21 12:42


Admission Diagnosis [ADT] Urgent 


Admission Status [Patient Status] [ADT] Routine
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
## 2021-08-28 NOTE — EDM.PDOC
Scribed by Maria Elena Reddy 08/28/21 [id number] for Thalia Pabon NP





ED HPI GENERAL MEDICAL PROBLEM





- General


Chief Complaint: General


Stated Complaint: ARTHRITIS FLARE UP


Time Seen by Provider: 08/28/21 14:04


Source of Information: Reports: Patient, RN, RN Notes Reviewed


History Limitations: Reports: No Limitations





- History of Present Illness


INITIAL COMMENTS - FREE TEXT/NARRATIVE: 


Patient is a 50-year-old male who presents to ER with complaint of arthritic 

flare up. This began on Thursday with complaint of swelling and pain in foot 

joints, hips, hands, elbow right and shoulders. He has noticed rash to ventral 

arms today. He denies itching. Thursday he was not feeling well with body aches.

Unsure of fever or chills. No meds help the pain. He had diarrhea and nausea on 

Thursday. He has had no vomiting, chest pain or shortness of breath. He saw 

rheumatology in [place] last week. 


Onset: Gradual


Duration: Constant


Location: Reports: Generalized


Quality: Reports: Ache


Severity: Moderate


Improves with: Reports: None


Worsens with: Reports: None


Associated Symptoms: Reports: No Other Symptoms


  ** Generalized


Pain Score (Numeric/FACES): 9





- Related Data


                                    Allergies











Allergy/AdvReac Type Severity Reaction Status Date / Time


 


meperidine HCl [From Demerol] Allergy  Vomiting Verified 08/28/21 13:47


 


morphine Allergy  Vomiting Verified 08/28/21 13:47


 


Sulfa (Sulfonamide Allergy  Itching Verified 08/28/21 13:47





Antibiotics)     


 


bee stings Allergy  Tachycardia Uncoded 01/09/21 10:24











Home Meds: 


                                    Home Meds





Gabapentin [Neurontin] 1,200 mg PO TID 04/03/16 [History]


Multivitamin with Minerals [Multiple Vitamin] 1 tab PO DAILY 04/03/16 [History]


Omeprazole 20 mg PO DAILY 04/03/16 [History]


traZODone HCl [Trazodone HCl] 50 mg PO BEDTIME 05/20/18 [History]


Ascorbic Acid [Vitamin C] 1,000 mg PO DAILY 01/25/21 [History]


Digoxin 125 mcg PO DAILY 01/25/21 [History]


Eletriptan [Relpax] 40 mg PO ASDIRECTED PRN 01/25/21 [History]


Escitalopram [Lexapro] 20 mg PO DAILY 01/25/21 [History]


Metoprolol Tartrate 100 mg PO BID 01/25/21 [History]


Topiramate [Topamax] 75 mg PO BID 01/25/21 [History]


Turmeric Root Extract [Turmeric] 500 mg PO DAILY 01/25/21 [History]


Vitamin B Complex [B Complex] 1 tab PO DAILY 01/25/21 [History]


busPIRone HCl [busPIRone] 30 mg PO BID 01/25/21 [History]


methocarbamoL [Methocarbamol] 500 - 1,000 mg PO Q6HR PRN 01/25/21 [History]


Magnesium 200 mg PO DAILY 05/19/21 [History]


Zinc 50 mg PO DAILY 05/19/21 [History]


atorvaSTATin [Lipitor] 40 mg PO DAILY 05/19/21 [History]











Past Medical History


HEENT History: Reports: Impaired Vision


Other HEENT History: wears glasses


Cardiovascular History: Reports: CAD, Heart Valve Replacement, High Cholesterol,

 Hypertension, Other (See Below)


Other Cardiovascular History: open heart surgery


Respiratory History: Reports: None


Gastrointestinal History: Reports: GERD


Genitourinary History: Reports: Acute Renal Failure


Musculoskeletal History: Reports: Back Pain, Chronic, Neck Pain, Chronic, 

Osteoarthritis


Neurological History: Reports: Headaches, Chronic, Head Trauma, Migraines


Psychiatric History: Reports: Depression


Endocrine/Metabolic History: Reports: Obesity/BMI 30+


Hematologic History: Reports: None


Immunologic History: Reports: None


Oncologic (Cancer) History: Reports: None


Dermatologic History: Reports: None





- Infectious Disease History


Infectious Disease History: Reports: Chicken Pox, Influenza





- Past Surgical History


Head Surgeries/Procedures: Reports: None


Cardiovascular Surgical History: Reports: Valve Replacement


GI Surgical History: Reports: Hernia, Inguinal


Musculoskeletal Surgical History: Reports: Carpal Tunnel, Other (See Below)


Other Musculoskeletal Surgeries/Procedures:: back, neck surgery, spinal cord 

stimulator





Social & Family History





- Family History


Family Medical History: No Pertinent Family History





- Tobacco Use


Tobacco Use Status *Q: Never Tobacco User





- Caffeine Use


Caffeine Use: Reports: Coffee, Soda





- Recreational Drug Use


Recreational Drug Use: No





- Living Situation & Occupation


Living situation: Reports: , with Family


Occupation: Employed





ED ROS GENERAL





- Review of Systems


Review Of Systems: Comprehensive ROS is negative, except as noted in HPI.





ED EXAM, GENERAL





- Physical Exam


Exam: See Below


Exam Limited By: No Limitations


General Appearance: Mild Distress


Eye Exam: Bilateral Eye: EOMI, Normal Inspection, PERRL


Ears: Normal External Exam, Normal Canal, Hearing Grossly Normal, Normal TMs


Nose: Normal Inspection, Normal Mucosa, No Blood


Throat/Mouth: Normal Inspection, Normal Lips, Normal Teeth, Normal Gums, Normal 

Oropharynx, Normal Voice, No Airway Compromise


Head: Atraumatic, Normocephalic


Neck: Normal Inspection, Supple, Non-Tender, Full Range of Motion


Respiratory/Chest: No Respiratory Distress, Lungs Clear, Normal Breath Sounds, 

No Accessory Muscle Use, Chest Non-Tender


Cardiovascular: Normal Peripheral Pulses, Regular Rate, Rhythm, No Edema, No 

Gallop, No JVD, No Murmur, No Rub


GI/Abdominal: Normal Bowel Sounds, Soft, Non-Tender, No Organomegaly, No 

Distention, No Abnormal Bruit, No Mass


 (Male) Exam: Deferred


Rectal (Males) Exam: Deferred


Back Exam: Normal Inspection


Extremities: Other (pain and decreased range of motion.)


Neurological: Alert


Psychiatric: Anxious


Skin Exam: Other (macular erythematous rash to ventral arms.)


Lymphatic: No Adenopathy





Course





- Vital Signs


Last Recorded V/S: 


                                Last Vital Signs











Temp  97.3 F   08/28/21 13:42


 


Pulse  71   08/28/21 13:42


 


Resp  18   08/28/21 13:42


 


BP  126/71   08/28/21 13:42


 


Pulse Ox  99   08/28/21 13:42














- Orders/Labs/Meds


Meds: 





Medications














Discontinued Medications














Generic Name Dose Route Start Last Admin





  Trade Name Freq  PRN Reason Stop Dose Admin


 


Dexamethasone  8 mg  08/28/21 14:17 





  Dexamethasone 4 Mg/Ml Sdv  IM  08/28/21 14:18 





  ONETIME ONE  


 


Fentanyl  100 mcg  08/28/21 14:17 





  Fentanyl 100 Mcg/2 Ml Sdv  IM  08/28/21 14:18 





  ONETIME ONE  


 


Ketorolac Tromethamine  30 mg  08/28/21 14:17 





  Ketorolac 30 Mg/Ml Sdv  IM  08/28/21 14:18 





  ONETIME ONE  














Departure





- Departure


Time of Disposition: 14:35


Disposition: Home, Self-Care 01


Condition: Fair


Clinical Impression: 


 Rash





Joint pain


Qualifiers:


 Joint pain location: unspecified Qualified Code(s): M25.50 - Pain in 

unspecified joint








- Discharge Information


*PRESCRIPTION DRUG MONITORING PROGRAM REVIEWED*: No


*COPY OF PRESCRIPTION DRUG MONITORING REPORT IN PATIENT OLAYINKA: No


Instructions:  Joint Pain, Easy-to-Read, Rash, Adult, Easy-to-Read


Forms:  ED Department Discharge


Additional Instructions: 


Follow-up with your primary care provider on Monday


Follow-up with rheumatology regarding testing


Return to the ER with any worsening of problems





Sepsis Event Note (ED)





- Evaluation


Sepsis Screening Result: No Definite Risk





- Focused Exam


Vital Signs: 


                                   Vital Signs











  Temp Pulse Resp BP Pulse Ox


 


 08/28/21 13:42  97.3 F  71  18  126/71  99














I have read and agree with the documentation that has been completed regarding 

this visit. By signing this record, I attest that the documentation was complet

ed in my physical presence and is an accurate record of the encounter.

## 2021-11-20 NOTE — EDM.PDOC
ED HPI GENERAL MEDICAL PROBLEM





- General


Chief Complaint: Abdominal Pain


Stated Complaint: COVID POSITIVE / VOMITING BLOOD


Time Seen by Provider: 11/20/21 09:00


Source of Information: Reports: Patient


History Limitations: Reports: No Limitations





- History of Present Illness


INITIAL COMMENTS - FREE TEXT/NARRATIVE: 





ED with report of being COVID +. This am small coughing episode then threw up 

some, noticed pink streaks in vomitus.  Mild indigestion, Decreased appetite. No

diarrhea. Chills no fever, No SOB, fatigue. 


Treatments PTA: Reports: Acetaminophen


  ** abdomen


Pain Score (Numeric/FACES): 5





- Related Data


                                    Allergies











Allergy/AdvReac Type Severity Reaction Status Date / Time


 


meperidine HCl [From Demerol] Allergy  Vomiting Verified 11/20/21 08:53


 


morphine Allergy  Vomiting Verified 11/20/21 08:53


 


Sulfa (Sulfonamide Allergy  Itching Verified 11/20/21 08:53





Antibiotics)     


 


bee stings Allergy  Tachycardia Uncoded 01/09/21 10:24











Home Meds: 


                                    Home Meds





Gabapentin [Neurontin] 1,200 mg PO TID 04/03/16 [History]


Multivitamin with Minerals [Multiple Vitamin] 1 tab PO DAILY 04/03/16 [History]


Omeprazole 20 mg PO DAILY 04/03/16 [History]


traZODone HCl [Trazodone HCl] 50 mg PO BEDTIME 05/20/18 [History]


Ascorbic Acid [Vitamin C] 1,000 mg PO DAILY 01/25/21 [History]


Digoxin 125 mcg PO DAILY 01/25/21 [History]


Eletriptan [Relpax] 40 mg PO ASDIRECTED PRN 01/25/21 [History]


Escitalopram [Lexapro] 20 mg PO DAILY 01/25/21 [History]


Metoprolol Tartrate 100 mg PO BID 01/25/21 [History]


Topiramate [Topamax] 75 mg PO BID 01/25/21 [History]


Turmeric Root Extract [Turmeric] 500 mg PO DAILY 01/25/21 [History]


Vitamin B Complex [B Complex] 1 tab PO DAILY 01/25/21 [History]


busPIRone HCl [busPIRone] 30 mg PO BID 01/25/21 [History]


methocarbamoL [Methocarbamol] 500 - 1,000 mg PO Q6HR PRN 01/25/21 [History]


Magnesium 200 mg PO DAILY 05/19/21 [History]


Zinc 50 mg PO DAILY 05/19/21 [History]


atorvaSTATin [Lipitor] 40 mg PO DAILY 05/19/21 [History]











Past Medical History


HEENT History: Reports: Impaired Vision


Other HEENT History: wears glasses


Cardiovascular History: Reports: CAD, Heart Valve Replacement, High Cholesterol,

 Hypertension, Other (See Below)


Other Cardiovascular History: open heart surgery


Respiratory History: Reports: None


Gastrointestinal History: Reports: GERD, Other (See Below)


Other Gastrointestinal History: ulcer


Genitourinary History: Reports: Acute Renal Failure


Musculoskeletal History: Reports: Back Pain, Chronic, Neck Pain, Chronic, 

Osteoarthritis


Neurological History: Reports: Headaches, Chronic, Head Trauma, Migraines


Psychiatric History: Reports: Anxiety, Depression


Endocrine/Metabolic History: Reports: Obesity/BMI 30+


Hematologic History: Reports: None


Immunologic History: Reports: None


Oncologic (Cancer) History: Reports: None


Dermatologic History: Reports: None





- Infectious Disease History


Infectious Disease History: Reports: Chicken Pox, Influenza, Novel Coronavirus





- Past Surgical History


Head Surgeries/Procedures: Reports: None


Cardiovascular Surgical History: Reports: Valve Replacement


GI Surgical History: Reports: Hernia, Inguinal


Musculoskeletal Surgical History: Reports: Carpal Tunnel, Other (See Below)


Other Musculoskeletal Surgeries/Procedures:: back, neck surgery, spinal cord 

stimulator





Social & Family History





- Family History


Family Medical History: No Pertinent Family History





- Tobacco Use


Tobacco Use Status *Q: Never Tobacco User





- Caffeine Use


Caffeine Use: Reports: None





- Recreational Drug Use


Recreational Drug Use: No





- Living Situation & Occupation


Living situation: Reports: , with Family


Occupation: Employed





ED ROS GENERAL





- Review of Systems


Review Of Systems: Comprehensive ROS is negative, except as noted in HPI.





ED EXAM, GI/ABD





- Physical Exam


Exam: See Below


Exam Limited By: No Limitations


General Appearance: Alert, No Apparent Distress


Ears: Normal External Exam


Nose: Normal Inspection


Throat/Mouth: Normal Inspection


Head: Atraumatic, Normocephalic


Neck: Normal Inspection


Respiratory/Chest: No Respiratory Distress, Lungs Clear, Normal Breath Sounds


Cardiovascular: Regular Rate, Rhythm


GI/Abdominal Exam: Normal Bowel Sounds, Soft, Non-Tender.  No: Abnormal Bowel 

Sounds


Rectal (Males) Exam: Heme - Stool


Back Exam: Normal Inspection, Full Range of Motion


Neurological: Alert, Oriented, Normal Cognition


Psychiatric: Normal Affect


Skin Exam: Warm, Dry, Intact, Normal Color





Course





- Vital Signs


Last Recorded V/S: 


                                Last Vital Signs











Temp  98.2 F   11/20/21 08:45


 


Pulse  85   11/20/21 08:45


 


Resp  16   11/20/21 08:45


 


BP  123/81   11/20/21 08:45


 


Pulse Ox  92 L  11/20/21 08:45














- Orders/Labs/Meds


Orders: 


                               Active Orders 24 hr











 Category Date Time Status


 


 CULTURE BLOOD [BC] Stat Lab  11/20/21 09:00 Received











Labs: 


                                Laboratory Tests











  11/20/21 11/20/21 11/20/21 Range/Units





  09:00 09:00 09:00 


 


WBC  4.3 L    (5.0-10.0)  10^3/uL


 


RBC  4.91    (4.6-6.2)  10^6/uL


 


Hgb  14.7  D    (14.0-18.0)  g/dL


 


Hct  43.7    (40.0-54.0)  %


 


MCV  89.0  D    ()  fL


 


MCH  29.9    (27.0-34.0)  pg


 


MCHC  33.6    (33.0-35.0)  g/dL


 


Plt Count  119 L    (150-450)  10^3/uL


 


Neut % (Auto)  71.2    (42.2-75.2)  %


 


Lymph % (Auto)  17.2 L    (20.5-50.1)  %


 


Mono % (Auto)  11.2 H    (2-8)  %


 


Eos % (Auto)  0.2 L    (1.0-3.0)  %


 


Baso % (Auto)  0.2    (0.0-1.0)  %


 


PT   10.5   (9.0-12.0)  SEC


 


INR   1.0   (0.9-1.2)  


 


D-Dimer, Quantitative   249   (0-400)  ng/mL


 


Sodium    136  D  (136-145)  mmol/L


 


Potassium    3.6  (3.5-5.1)  mmol/L


 


Chloride    101  ()  mmol/L


 


Carbon Dioxide    22  (21-32)  mmol/L


 


Anion Gap    16.6 H  (7-13)  mEq/L


 


BUN    13  (7-18)  mg/dL


 


Creatinine    1.03  (0.70-1.30)  mg/dL


 


Est Cr Clr Drug Dosing    81.11  mL/min


 


Estimated GFR (MDRD)    > 60  


 


BUN/Creatinine Ratio    12.6  (No establ ref range)  


 


Glucose    114 H  (70-99)  mg/dL


 


Calcium    8.0 L  (8.5-10.1)  mg/dL


 


Magnesium    1.9  (1.8-2.4)  mg/dL


 


Total Bilirubin    0.6  (0.2-1.0)  mg/dL


 


AST    73 H  (15-37)  U/L


 


ALT    79 H  (16-63)  U/L


 


Alkaline Phosphatase    97  ()  U/L


 


C-Reactive Protein    12.1 H  (0.0-0.9)  mg/dL


 


B-Natriuretic Peptide    62  (0-100)  pg/ml


 


Total Protein    7.7  (6.4-8.2)  g/dL


 


Albumin    3.4  (3.4-5.0)  g/dL


 


Globulin    4.3  


 


Albumin/Globulin Ratio    0.8  


 


Digoxin     (0.9-2.0)  ng/mL














  11/20/21 Range/Units





  09:00 


 


WBC   (5.0-10.0)  10^3/uL


 


RBC   (4.6-6.2)  10^6/uL


 


Hgb   (14.0-18.0)  g/dL


 


Hct   (40.0-54.0)  %


 


MCV   ()  fL


 


MCH   (27.0-34.0)  pg


 


MCHC   (33.0-35.0)  g/dL


 


Plt Count   (150-450)  10^3/uL


 


Neut % (Auto)   (42.2-75.2)  %


 


Lymph % (Auto)   (20.5-50.1)  %


 


Mono % (Auto)   (2-8)  %


 


Eos % (Auto)   (1.0-3.0)  %


 


Baso % (Auto)   (0.0-1.0)  %


 


PT   (9.0-12.0)  SEC


 


INR   (0.9-1.2)  


 


D-Dimer, Quantitative   (0-400)  ng/mL


 


Sodium   (136-145)  mmol/L


 


Potassium   (3.5-5.1)  mmol/L


 


Chloride   ()  mmol/L


 


Carbon Dioxide   (21-32)  mmol/L


 


Anion Gap   (7-13)  mEq/L


 


BUN   (7-18)  mg/dL


 


Creatinine   (0.70-1.30)  mg/dL


 


Est Cr Clr Drug Dosing   mL/min


 


Estimated GFR (MDRD)   


 


BUN/Creatinine Ratio   (No establ ref range)  


 


Glucose   (70-99)  mg/dL


 


Calcium   (8.5-10.1)  mg/dL


 


Magnesium   (1.8-2.4)  mg/dL


 


Total Bilirubin   (0.2-1.0)  mg/dL


 


AST   (15-37)  U/L


 


ALT   (16-63)  U/L


 


Alkaline Phosphatase   ()  U/L


 


C-Reactive Protein   (0.0-0.9)  mg/dL


 


B-Natriuretic Peptide   (0-100)  pg/ml


 


Total Protein   (6.4-8.2)  g/dL


 


Albumin   (3.4-5.0)  g/dL


 


Globulin   


 


Albumin/Globulin Ratio   


 


Digoxin  0.7 L  (0.9-2.0)  ng/mL











Meds: 


Medications














Discontinued Medications














Generic Name Dose Route Start Last Admin





  Trade Name Freq  PRN Reason Stop Dose Admin


 


Famotidine  20 mg  11/20/21 10:32  11/20/21 10:54





  Famotidine 20 Mg/2 Ml Sdv  IVPUSH  11/20/21 10:33  20 mg





  ONETIME ONE   Administration


 


Ondansetron HCl  4 mg  11/20/21 10:32  11/20/21 10:53





  Ondansetron 4 Mg/2 Ml Sdv  IVPUSH  11/20/21 10:33  4 mg





  ONETIME ONE   Administration














Departure





- Departure


Time of Disposition: 10:51


Disposition: Home, Self-Care 01


Condition: Good


Clinical Impression: 


 COVID, Nausea








- Discharge Information


*PRESCRIPTION DRUG MONITORING PROGRAM REVIEWED*: No


*COPY OF PRESCRIPTION DRUG MONITORING REPORT IN PATIENT OLAYINKA: No


Instructions:  Nausea and Vomiting, Adult, Easy-to-Read


Forms:  ED Department Discharge


Additional Instructions: 


humidifier


increase omerazole 20mg twice daily for one week


then resume one daily


frequent smaller meals


avoid caffeine soda greasy acid type foods


zofran 4mg ODT one every 6 hours as needed for nausea





Sepsis Event Note (ED)





- Evaluation


Sepsis Screening Result: No Definite Risk





- Focused Exam


Vital Signs: 


                                   Vital Signs











  Temp Pulse Resp BP Pulse Ox


 


 11/20/21 08:45  98.2 F  85  16  123/81  92 L














- My Orders


Last 24 Hours: 


My Active Orders





11/20/21 09:00


CULTURE BLOOD [BC] Stat 














- Assessment/Plan


Last 24 Hours: 


My Active Orders





11/20/21 09:00


CULTURE BLOOD [BC] Stat
